# Patient Record
Sex: FEMALE | Race: WHITE
[De-identification: names, ages, dates, MRNs, and addresses within clinical notes are randomized per-mention and may not be internally consistent; named-entity substitution may affect disease eponyms.]

---

## 2018-07-24 ENCOUNTER — HOSPITAL ENCOUNTER (EMERGENCY)
Dept: HOSPITAL 76 - ED | Age: 43
Discharge: HOME | End: 2018-07-24
Payer: COMMERCIAL

## 2018-07-24 VITALS — SYSTOLIC BLOOD PRESSURE: 137 MMHG | DIASTOLIC BLOOD PRESSURE: 83 MMHG

## 2018-07-24 DIAGNOSIS — N30.00: Primary | ICD-10-CM

## 2018-07-24 LAB
BILIRUB UR QL CFM: NEGATIVE
CLARITY UR REFRACT.AUTO: (no result)
GLUCOSE UR QL STRIP.AUTO: NEGATIVE MG/DL
KETONES UR QL STRIP.AUTO: NEGATIVE MG/DL
NITRITE UR QL STRIP.AUTO: POSITIVE
PH UR STRIP.AUTO: 6.5 PH (ref 5–7.5)
PROT UR STRIP.AUTO-MCNC: 100 MG/DL
RBC # UR STRIP.AUTO: (no result) /UL
RBC # URNS HPF: (no result) /HPF (ref 0–5)
SP GR UR STRIP.AUTO: <=1.005 (ref 1–1.03)
SQUAMOUS URNS QL MICRO: (no result)
UROBILINOGEN UR QL STRIP.AUTO: (no result) E.U./DL
UROBILINOGEN UR STRIP.AUTO-MCNC: NEGATIVE MG/DL

## 2018-07-24 PROCEDURE — 87181 SC STD AGAR DILUTION PER AGT: CPT

## 2018-07-24 PROCEDURE — 99283 EMERGENCY DEPT VISIT LOW MDM: CPT

## 2018-07-24 PROCEDURE — 81003 URINALYSIS AUTO W/O SCOPE: CPT

## 2018-07-24 PROCEDURE — 81001 URINALYSIS AUTO W/SCOPE: CPT

## 2018-07-24 PROCEDURE — 87086 URINE CULTURE/COLONY COUNT: CPT

## 2018-07-24 NOTE — ED PHYSICIAN DOCUMENTATION
PD HPI FEMALE 





- Stated complaint


Stated Complaint: FEMALE 





- Chief complaint


Chief Complaint: Abd Pain





- History obtained from


History obtained from: Patient





- History of Present Illness


Timing - onset: How many days ago (4)


Timing - duration: Days (4)


Timing - details: Gradual onset, Still present


Associated symptoms: Dysuria, Urinary frequency


Similar symptoms before: Diagnosis (UTI)


Recently seen: Clinic (one month ago with UTI)





- Additional information


Additional information: 


43-year-old female has developed symptoms of urinary tract infection about 4 

days ago.  She states that her symptoms were coming and going she did not 

really push fluids or anything and then yesterday afternoon her symptoms became 

worse.  She is in here tonight with dysuria and bloody urine.








Review of Systems


Constitutional: denies: Fever


Eyes: denies: Decreased vision


Ears: denies: Ear pain


Nose: denies: Congestion


Throat: denies: Sore throat


Cardiac: denies: Chest pain / pressure, Palpitations


Respiratory: denies: Dyspnea, Cough


GI: denies: Abdominal Pain, Nausea, Vomiting


: reports: Dysuria, Frequency, Hematuria


Musculoskeletal: reports: Back pain.  denies: Neck pain, Extremity pain, Joint 

pain





PD PAST MEDICAL HISTORY





- Present Medications


Home Medications: 


 Ambulatory Orders











 Medication  Instructions  Recorded  Confirmed


 


Ciprofloxacin HCl [Cipro] 500 mg PO BID #10 tablet 07/24/18 














- Allergies


Allergies/Adverse Reactions: 


 Allergies











Allergy/AdvReac Type Severity Reaction Status Date / Time


 


Sulfa (Sulfonamide AdvReac  Hives Verified 07/24/18 04:35





Antibiotics)     














PD ED PE NORMAL





- Vitals


Vital signs reviewed: Yes (tachy and hypertensive )





- General


General: Alert and oriented X 3, No acute distress, Well developed/nourished





- HEENT


HEENT: Atraumatic, PERRL, EOMI





- Neck


Neck: Supple, no meningeal sign





- Cardiac


Cardiac: RRR, No murmur





- Respiratory


Respiratory: No respiratory distress, Clear bilaterally





- Back


Back: No CVA TTP, No spinal TTP





- Derm


Derm: Normal color, Warm and dry, No rash





- Extremities


Extremities: No deformity, No edema





- Neuro


Neuro: Alert and oriented X 3, No motor deficit, No sensory deficit, Normal 

speech


Eye Opening: Spontaneous


Motor: Obeys Commands


Verbal: Oriented


GCS Score: 15





- Psych


Psych: Normal mood, Normal affect





Results





- Vitals


Vitals: 





 Vital Signs - 24 hr











  07/24/18





  04:01


 


Temperature 36.5 C


 


Heart Rate 101 H


 


Respiratory 17





Rate 


 


Blood Pressure 137/83 H


 


O2 Saturation 100








 Oxygen











O2 Source                      Room air

















- Labs


Labs: 





 Laboratory Tests











  07/24/18





  04:00


 


Urine Color  RED/BLOODY


 


Urine Clarity  BLOODY


 


Urine pH  6.5


 


Ur Specific Gravity  <=1.005


 


Urine Protein  100 H


 


Urine Glucose (UA)  NEGATIVE


 


Urine Ketones  NEGATIVE


 


Urine Occult Blood  LARGE H


 


Urine Nitrite  POSITIVE H


 


Urine Bilirubin  NEGATIVE


 


Urine Urobilinogen  0.2 (NORMAL)


 


Ur Leukocyte Esterase  LARGE H


 


Urine RBC  TNTC H


 


Urine WBC  >25 H


 


Ur Squamous Epith Cells  NONE SEEN


 


Urine Bacteria  None Seen


 


Ur Microscopic Review  INDICATED


 


Urine Culture Comments  INDICATED














PD MEDICAL DECISION MAKING





- ED course


Complexity details: reviewed results, re-evaluated patient, considered 

differential, d/w patient


ED course: 





43-year-old female with urinary tract symptoms has urinary tract infection and 

she has a lot of white blood cells and blood in her urine.  She is administered 

Cipro here in the emergency department and she is given a Pyridium prepack.





- Sepsis Event


Vital Signs: 





 Vital Signs - 24 hr











  07/24/18





  04:01


 


Temperature 36.5 C


 


Heart Rate 101 H


 


Respiratory 17





Rate 


 


Blood Pressure 137/83 H


 


O2 Saturation 100








 Oxygen











O2 Source                      Room air

















Departure





- Departure


Disposition: 01 Home, Self Care


Clinical Impression: 


Urinary tract infection


Qualifiers:


 Urinary tract infection type: acute cystitis Hematuria presence: with 

hematuria Qualified Code(s): N30.01 - Acute cystitis with hematuria





Instructions:  ED UTI Cystitis Female


Follow-Up: 


[facility] [place] [Provider Group]


Prescriptions: 


Ciprofloxacin HCl [Cipro] 500 mg PO BID #10 tablet

## 2018-11-09 ENCOUNTER — HOSPITAL ENCOUNTER (EMERGENCY)
Dept: HOSPITAL 76 - ED | Age: 43
Discharge: HOME | End: 2018-11-09
Payer: COMMERCIAL

## 2018-11-09 VITALS — DIASTOLIC BLOOD PRESSURE: 84 MMHG | SYSTOLIC BLOOD PRESSURE: 122 MMHG

## 2018-11-09 DIAGNOSIS — R30.0: Primary | ICD-10-CM

## 2018-11-09 LAB
CLARITY UR REFRACT.AUTO: CLEAR
GLUCOSE UR QL STRIP.AUTO: NEGATIVE MG/DL
HCG UR QL: NEGATIVE
KETONES UR QL STRIP.AUTO: NEGATIVE MG/DL
NITRITE UR QL STRIP.AUTO: NEGATIVE
PH UR STRIP.AUTO: 6.5 PH (ref 5–7.5)
PROT UR STRIP.AUTO-MCNC: NEGATIVE MG/DL
RBC # UR STRIP.AUTO: NEGATIVE /UL
SP GR UR STRIP.AUTO: <=1.005 (ref 1–1.03)
UROBILINOGEN UR QL STRIP.AUTO: (no result) E.U./DL
UROBILINOGEN UR STRIP.AUTO-MCNC: NEGATIVE MG/DL

## 2018-11-09 PROCEDURE — 87491 CHLMYD TRACH DNA AMP PROBE: CPT

## 2018-11-09 PROCEDURE — 81025 URINE PREGNANCY TEST: CPT

## 2018-11-09 PROCEDURE — 81001 URINALYSIS AUTO W/SCOPE: CPT

## 2018-11-09 PROCEDURE — 87591 N.GONORRHOEAE DNA AMP PROB: CPT

## 2018-11-09 PROCEDURE — 81003 URINALYSIS AUTO W/O SCOPE: CPT

## 2018-11-09 PROCEDURE — 87086 URINE CULTURE/COLONY COUNT: CPT

## 2018-11-09 PROCEDURE — 99283 EMERGENCY DEPT VISIT LOW MDM: CPT

## 2018-11-09 NOTE — ED PHYSICIAN DOCUMENTATION
History of Present Illness





- Stated complaint


Stated Complaint: FEMALE 





- Chief complaint


Chief Complaint: General





- Additonal information


Additional information: 





hx from pt


healthy 43 f


recrrent UTI


tx lated Oct at [facility] for UTI with b she does not recall name of


LMP just finished


now with recurrent sx of dysuria and lower abd pain


also some itching


no dc


swears she does not have an STD





Review of Systems


Constitutional: denies: Fever, Chills


Cardiac: denies: Chest pain / pressure


Respiratory: denies: Dyspnea


GI: reports: Abdominal Pain (lower)


: reports: Dysuria.  denies: Discharge





PD PAST MEDICAL HISTORY





- Past Medical History


Past Medical History: Yes


Cardiovascular: None


Respiratory: None


Neuro: None


Endocrine/Autoimmune: None


GI: None


GYN: None


: None


HEENT: None


Psych: None


Musculoskeletal: None


Derm: None





- Past Surgical History


Past Surgical History: No





- Present Medications


Home Medications: 


                                Ambulatory Orders











 Medication  Instructions  Recorded  Confirmed


 


Cephalexin [Keflex] 500 mg PO Q6H #28 capsule 11/09/18 


 


Cranberry  11/09/18 


 


buPROPion [Wellbutrin Xl] 300 mg PO DAILY 11/09/18 11/09/18














- Allergies


Allergies/Adverse Reactions: 


                                    Allergies











Allergy/AdvReac Type Severity Reaction Status Date / Time


 


Sulfa (Sulfonamide AdvReac  Hives Verified 11/09/18 10:41





Antibiotics)     














- Social History


Does the pt smoke?: No


Smoking Status: Never smoker


Does the pt drink ETOH?: No


Does the pt have substance abuse?: No





- Immunizations


Immunizations are current?: Yes





- POLST


Patient has POLST: No





PD ED PE NORMAL





- Vitals


Vital signs reviewed: Yes





- Cardiac


Cardiac: RRR





- Respiratory


Respiratory: No respiratory distress





- Abdomen


Abdomen: Soft, Other (mild suprapubic pain, no focal RLQ pain and no peritoneal 

sx)





- Female 


Female : Other (external = mild erythema no dc)





Results





- Vitals


Vitals: 


                               Vital Signs - 24 hr











  11/09/18





  10:39


 


Temperature 36.2 C L


 


Heart Rate 97


 


Respiratory 16





Rate 


 


Blood Pressure 124/89 H


 


O2 Saturation 100








                                     Oxygen











O2 Source                      Room air

















- Labs


Labs: 


                                Laboratory Tests











  11/09/18





  10:45


 


Urine Color  YELLOW


 


Urine Clarity  CLEAR


 


Urine pH  6.5


 


Ur Specific Gravity  <=1.005


 


Urine Protein  NEGATIVE


 


Urine Glucose (UA)  NEGATIVE


 


Urine Ketones  NEGATIVE


 


Urine Occult Blood  NEGATIVE


 


Urine Nitrite  NEGATIVE


 


Urine Bilirubin  NEGATIVE


 


Urine Urobilinogen  0.2 (NORMAL)


 


Ur Leukocyte Esterase  NEGATIVE


 


Ur Microscopic Review  NOT INDICATED


 


Urine Culture Comments  NOT INDICATED


 


Urine HCG, Qual  NEGATIVE














PD MEDICAL DECISION MAKING





- ED course


ED course: 





called [facility] for [facility]ure results


after about an hr got the UA but not the culture


did learn pt was txed with macrobid


still having sx


may be partially txed


do not have sensitivites to assist


decision making


will try keflex X 1 week


and to be safe added on GC chlamydia to urine though feel this is unlikely cause

per pt hx





Departure





- Departure


Disposition: 01 Home, Self Care


Clinical Impression: 


 Dysuria





Condition: Good


Instructions:  ED Dysuria Uncertain Cause, Vaginal Infec Yeast


Follow-Up: 


[facility]UTIERRE[facility] PA-C [Primary Care Provider] - 


Prescriptions: 


Cephalexin [Keflex] 500 mg PO Q6H #28 capsule


Comments: 


I was not able to get the culture results from [facility] today


But I did learn the antibiotic you took was called samantha


Since you are still having symptoms of a UTI and may be partially treated i have

 prescribed a new antibiotic called keflex - take every 6 hr for a week.


We will run a urine culture and the ER staff will call you if the results 

indicate a need to change antibiotics


And also recommend yeast infection cream which is available over the counter 

when you go to the pharmacy to  the antibiotic

## 2019-03-10 ENCOUNTER — HOSPITAL ENCOUNTER (EMERGENCY)
Dept: HOSPITAL 76 - ED | Age: 44
Discharge: HOME | End: 2019-03-10
Payer: COMMERCIAL

## 2019-03-10 VITALS — SYSTOLIC BLOOD PRESSURE: 146 MMHG | DIASTOLIC BLOOD PRESSURE: 98 MMHG

## 2019-03-10 DIAGNOSIS — N30.00: Primary | ICD-10-CM

## 2019-03-10 LAB
CLARITY UR REFRACT.AUTO: CLEAR
GLUCOSE UR QL STRIP.AUTO: NEGATIVE MG/DL
HCG UR QL: NEGATIVE
KETONES UR QL STRIP.AUTO: NEGATIVE MG/DL
NITRITE UR QL STRIP.AUTO: NEGATIVE
PH UR STRIP.AUTO: 7 PH (ref 5–7.5)
PROT UR STRIP.AUTO-MCNC: NEGATIVE MG/DL
RBC # UR STRIP.AUTO: (no result) /UL
SP GR UR STRIP.AUTO: <=1.005 (ref 1–1.03)
SP GR UR STRIP.AUTO: <=1.005 (ref 1–1.03)
SQUAMOUS URNS QL MICRO: (no result)
UROBILINOGEN UR QL STRIP.AUTO: (no result) E.U./DL
UROBILINOGEN UR STRIP.AUTO-MCNC: NEGATIVE MG/DL

## 2019-03-10 PROCEDURE — 81003 URINALYSIS AUTO W/O SCOPE: CPT

## 2019-03-10 PROCEDURE — 99283 EMERGENCY DEPT VISIT LOW MDM: CPT

## 2019-03-10 PROCEDURE — 81025 URINE PREGNANCY TEST: CPT

## 2019-03-10 PROCEDURE — 81001 URINALYSIS AUTO W/SCOPE: CPT

## 2019-03-10 PROCEDURE — 87086 URINE CULTURE/COLONY COUNT: CPT

## 2019-03-10 NOTE — ED PHYSICIAN DOCUMENTATION
PD HPI FEMALE 





- Stated complaint


Stated Complaint: FEM /CHILLS





- Chief complaint


Chief Complaint: UTI





- Additional information


Additional information: 


43-year-old female presents to the emergency department with complaints of 

dysuria which started this evening.  The patient has had ongoing intermittent 

episodes of urinary tract infections recently.  The patient is scheduled to see 

urology in the next 2 weeks.  The patient denies flank pain.  This is similar to

prior episodes.  No triggering factors.  The patient denies vaginal discharge, 

new sexual partners or any focal area of pain.  Symptoms are described as 

moderate








Review of Systems


Constitutional: denies: Fever, Chills, Fatigue


Eyes: denies: Discharge


Ears: denies: Ear pain


Nose: denies: Congestion


Throat: denies: Sore throat


Cardiac: denies: Chest pain / pressure


Respiratory: denies: Cough


GI: denies: Vomiting, Diarrhea


: reports: Dysuria


Neurologic: denies: Generalized weakness





PD PAST MEDICAL HISTORY





- Past Medical History


Past Medical History: Yes


Cardiovascular: None


Respiratory: None


Neuro: None


Endocrine/Autoimmune: None


GI: None


GYN: None


: Other


HEENT: None


Psych: None


Musculoskeletal: None


Derm: None


Other Past Medical History: UTI





- Past Surgical History


Past Surgical History: No





- Present Medications


Home Medications: 


                                Ambulatory Orders











 Medication  Instructions  Recorded  Confirmed


 


buPROPion [Wellbutrin Xl] 300 mg PO DAILY 11/09/18 03/10/19


 


Cephalexin [Keflex] 500 mg PO BID #14 capsule 03/10/19 


 


Phenazopyridine HCl [Pyridium] 200 mg PO TID PRN #6 tablet 03/10/19 














- Allergies


Allergies/Adverse Reactions: 


                                    Allergies











Allergy/AdvReac Type Severity Reaction Status Date / Time


 


Sulfa (Sulfonamide AdvReac  Hives Verified 03/10/19 00:13





Antibiotics)     














- Social History


Does the pt smoke?: No


Smoking Status: Never smoker


Does the pt drink ETOH?: No


Does the pt have substance abuse?: No





- Immunizations


Immunizations are current?: Yes





- POLST


Patient has POLST: No





PD ED PE NORMAL





- General


General: Alert and oriented X 3, No acute distress





- HEENT


HEENT: Atraumatic, PERRL, EOMI, Ears normal





- Cardiac


Cardiac: RRR





- Respiratory


Respiratory: No respiratory distress, Clear bilaterally





- Back


Back: No CVA TTP





- Derm


Derm: Normal color





- Extremities


Extremities: No deformity





- Neuro


Neuro: Alert and oriented X 3, Normal speech





- Psych


Psych: Normal affect





Results





- Vitals


Vitals: 


                               Vital Signs - 24 hr











  03/10/19





  00:05


 


Temperature 36.6 C


 


Heart Rate 89


 


Respiratory 18





Rate 


 


Blood Pressure 146/98 H


 


O2 Saturation 100








                                     Oxygen











O2 Source                      Room air

















- Labs


Labs: 


                                Laboratory Tests











  03/10/19 03/10/19





  00:12 00:12


 


Urine Color  YELLOW 


 


Urine Clarity  CLEAR 


 


Urine pH  7.0 


 


Ur Specific Gravity  <=1.005  <=1.005


 


Urine Protein  NEGATIVE 


 


Urine Glucose (UA)  NEGATIVE 


 


Urine Ketones  NEGATIVE 


 


Urine Occult Blood  MODERATE H 


 


Urine Nitrite  NEGATIVE 


 


Urine Bilirubin  NEGATIVE 


 


Urine Urobilinogen  0.2 (NORMAL) 


 


Ur Leukocyte Esterase  MODERATE H 


 


Urine RBC  None Seen 


 


Urine WBC  11-25 H 


 


Ur Squamous Epith Cells  RARE Squamous 


 


Urine Bacteria  Rare 


 


Ur Microscopic Review  INDICATED 


 


Urine Culture Comments  INDICATED 


 


Urine HCG, Qual   NEGATIVE














PD MEDICAL DECISION MAKING





- ED course


ED course: 


The patient will be treated for an acute urinary tract infection, the patient 

appears appropriate for ongoing outpatient management with oral antibiotics.  I 

discussed warning signs and recommended returning to the emergency department 

immediately for any worsening or concerns.








Departure





- Departure


Disposition: 01 Home, Self Care


Clinical Impression: 


 Cystitis





Condition: Good


Instructions:  ED UTI Cystitis Female


Prescriptions: 


Cephalexin [Keflex] 500 mg PO BID #14 capsule


Phenazopyridine HCl [Pyridium] 200 mg PO TID PRN #6 tablet


 PRN Reason: dysuria


Comments: 


Please follow-up with primary care for recheck and reevaluation





Please follow-up with the urologist as scheduled to further assess your 

recurrent urinary tract infections





Please return to the emergency department for any worsening or any concerns